# Patient Record
Sex: FEMALE | Race: WHITE
[De-identification: names, ages, dates, MRNs, and addresses within clinical notes are randomized per-mention and may not be internally consistent; named-entity substitution may affect disease eponyms.]

---

## 2017-03-20 ENCOUNTER — HOSPITAL ENCOUNTER (EMERGENCY)
Dept: HOSPITAL 87 - ER | Age: 62
LOS: 1 days | Discharge: HOME | End: 2017-03-21
Payer: MEDICAID

## 2017-03-20 VITALS — BODY MASS INDEX: 27.34 KG/M2 | WEIGHT: 154.32 LBS | HEIGHT: 63 IN

## 2017-03-20 VITALS — DIASTOLIC BLOOD PRESSURE: 69 MMHG | SYSTOLIC BLOOD PRESSURE: 107 MMHG

## 2017-03-20 DIAGNOSIS — E87.6: ICD-10-CM

## 2017-03-20 DIAGNOSIS — E83.51: ICD-10-CM

## 2017-03-20 DIAGNOSIS — Z90.49: ICD-10-CM

## 2017-03-20 DIAGNOSIS — E07.9: ICD-10-CM

## 2017-03-20 DIAGNOSIS — Z98.51: ICD-10-CM

## 2017-03-20 DIAGNOSIS — M19.90: ICD-10-CM

## 2017-03-20 DIAGNOSIS — Z88.5: ICD-10-CM

## 2017-03-20 DIAGNOSIS — N28.1: ICD-10-CM

## 2017-03-20 DIAGNOSIS — R94.8: ICD-10-CM

## 2017-03-20 DIAGNOSIS — Z91.013: ICD-10-CM

## 2017-03-20 DIAGNOSIS — R10.84: Primary | ICD-10-CM

## 2017-03-20 DIAGNOSIS — E78.5: ICD-10-CM

## 2017-03-20 DIAGNOSIS — R91.1: ICD-10-CM

## 2017-03-20 PROCEDURE — 83690 ASSAY OF LIPASE: CPT

## 2017-03-20 PROCEDURE — 80053 COMPREHEN METABOLIC PANEL: CPT

## 2017-03-20 PROCEDURE — 99285 EMERGENCY DEPT VISIT HI MDM: CPT

## 2017-03-20 PROCEDURE — 36415 COLL VENOUS BLD VENIPUNCTURE: CPT

## 2017-03-20 PROCEDURE — 84703 CHORIONIC GONADOTROPIN ASSAY: CPT

## 2017-03-20 PROCEDURE — 84702 CHORIONIC GONADOTROPIN TEST: CPT

## 2017-03-20 PROCEDURE — 85025 COMPLETE CBC W/AUTO DIFF WBC: CPT

## 2017-03-20 PROCEDURE — 96374 THER/PROPH/DIAG INJ IV PUSH: CPT

## 2017-03-20 PROCEDURE — 96361 HYDRATE IV INFUSION ADD-ON: CPT

## 2017-03-21 LAB
ALBUMIN SERPL BCP-MCNC: 3.2 G/DL (ref 3.4–5)
ALT SERPL W P-5'-P-CCNC: 39 IU/L (ref 13–61)
ANION GAP SERPL CALC-SCNC: 13 MMOL/L
BASOPHILS NFR BLD AUTO: 0.4 % (ref 0–2)
BUN SERPL-MCNC: 10 MG/DL (ref 7–21)
CALCIUM SERPL-MCNC: 8.1 MG/DL (ref 8.5–10.1)
CHLORIDE SERPL-SCNC: 110 MEQ/L (ref 98–107)
CO2 SERPL-SCNC: 21 MEQ/L (ref 21–32)
EOSINOPHIL NFR BLD AUTO: 1.5 % (ref 0–5)
ERYTHROCYTE [DISTWIDTH] IN BLOOD BY AUTOMATED COUNT: 13.7 % (ref 11.6–14.6)
GFR SERPLBLD BASED ON 1.73 SQ M-ARVRAT: > 60 ML/MIN (ref 60–?)
HCG SERPL QL: (no result)
HCT VFR BLD AUTO: 39.1 % (ref 36–48)
HEMOLYSIS INTERF INDEX SERPL-ACNC: 1 (ref 1–3)
HGB BLD-MCNC: 12.8 G/DL (ref 12–16)
ICTERIC INTERF INDEX SERPL-ACNC: 1 (ref 1–4)
LIPASE SERPL-CCNC: 129 IU/L (ref 73–393)
LIPEMIC INTERF INDEX SERPL-ACNC: 1 (ref 1–3)
LYMPHOCYTES NFR BLD AUTO: 26.5 % (ref 20–50)
MCH RBC QN AUTO: 28.9 PG (ref 28–32)
MCHC RBC AUTO-ENTMCNC: 32.8 G/DL (ref 31–37)
MCV RBC AUTO: 88.2 FL (ref 81–99)
MONOCYTES NFR BLD AUTO: 5.9 % (ref 2–8)
NEUTROPHILS NFR BLD AUTO: 65.7 % (ref 40–76)
PLATELET # BLD AUTO: 251 X1000/UL (ref 130–400)
PMV BLD AUTO: 7.6 FL (ref 7.4–10.4)
RBC # BLD AUTO: 4.44 MILL/UL (ref 4.2–5.4)
WBC # BLD: 8.6 X1000/UL (ref 4.5–11)

## 2018-02-05 ENCOUNTER — HOSPITAL ENCOUNTER (EMERGENCY)
Dept: HOSPITAL 87 - ER | Age: 63
Discharge: HOME | End: 2018-02-05
Payer: MEDICAID

## 2018-02-05 VITALS — BODY MASS INDEX: 25.78 KG/M2 | HEIGHT: 63 IN | WEIGHT: 145.51 LBS

## 2018-02-05 VITALS — DIASTOLIC BLOOD PRESSURE: 94 MMHG | SYSTOLIC BLOOD PRESSURE: 127 MMHG
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
## 2018-02-05 DIAGNOSIS — Z90.49: ICD-10-CM
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
## 2018-02-05 DIAGNOSIS — M19.90: ICD-10-CM

## 2018-02-05 DIAGNOSIS — Z98.51: ICD-10-CM
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
## 2018-02-05 DIAGNOSIS — Z91.013: ICD-10-CM

## 2018-02-05 DIAGNOSIS — Z88.5: ICD-10-CM

## 2018-02-05 DIAGNOSIS — S62.646A: Primary | ICD-10-CM

## 2018-02-05 PROCEDURE — 99284 EMERGENCY DEPT VISIT MOD MDM: CPT

## 2018-02-05 PROCEDURE — 73130 X-RAY EXAM OF HAND: CPT

## 2018-02-05 PROCEDURE — 29125 APPL SHORT ARM SPLINT STATIC: CPT

## 2019-02-26 ENCOUNTER — HOSPITAL ENCOUNTER (EMERGENCY)
Dept: HOSPITAL 87 - ER | Age: 64
Discharge: HOME | End: 2019-02-26
Payer: COMMERCIAL

## 2019-02-26 VITALS — SYSTOLIC BLOOD PRESSURE: 154 MMHG | DIASTOLIC BLOOD PRESSURE: 92 MMHG

## 2019-02-26 VITALS — HEIGHT: 64 IN | BODY MASS INDEX: 26.72 KG/M2 | WEIGHT: 156.53 LBS
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
## 2019-02-26 DIAGNOSIS — M19.90: ICD-10-CM

## 2019-02-26 DIAGNOSIS — Z98.890: ICD-10-CM

## 2019-02-26 DIAGNOSIS — Z90.49: ICD-10-CM

## 2019-02-26 DIAGNOSIS — Z88.6: ICD-10-CM

## 2019-02-26 DIAGNOSIS — Z98.51: ICD-10-CM
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
## 2019-02-26 DIAGNOSIS — S39.012A: Primary | ICD-10-CM

## 2019-02-26 DIAGNOSIS — Z91.013: ICD-10-CM

## 2019-02-26 LAB
APPEARANCE UR: CLEAR
COLOR UR: YELLOW
HGB UR QL STRIP: NEGATIVE
KETONES UR STRIP-MCNC: NEGATIVE MG/DL
LEUKOCYTE ESTERASE UR QL STRIP: NEGATIVE
NITRITE UR QL STRIP: NEGATIVE
PH UR STRIP: 6 [PH] (ref 4.5–8)
PROT UR QL STRIP: NEGATIVE
SP GR UR STRIP: 1 (ref 1–1.03)
UROBILINOGEN UR STRIP-MCNC: 0.2 E.U./DL (ref 0.2–1)

## 2019-02-26 PROCEDURE — 72100 X-RAY EXAM L-S SPINE 2/3 VWS: CPT

## 2019-02-26 PROCEDURE — 99284 EMERGENCY DEPT VISIT MOD MDM: CPT

## 2019-06-21 ENCOUNTER — HOSPITAL ENCOUNTER (OUTPATIENT)
Dept: HOSPITAL 10 - SDS | Age: 64
Discharge: HOME | End: 2019-06-21
Payer: COMMERCIAL

## 2019-06-21 ENCOUNTER — HOSPITAL ENCOUNTER (OUTPATIENT)
Dept: HOSPITAL 91 - SDS | Age: 64
Discharge: HOME | End: 2019-06-21
Payer: COMMERCIAL

## 2019-06-21 VITALS — RESPIRATION RATE: 26 BRPM | HEART RATE: 60 BPM | SYSTOLIC BLOOD PRESSURE: 101 MMHG | DIASTOLIC BLOOD PRESSURE: 66 MMHG

## 2019-06-21 VITALS — SYSTOLIC BLOOD PRESSURE: 107 MMHG | RESPIRATION RATE: 22 BRPM | DIASTOLIC BLOOD PRESSURE: 65 MMHG | HEART RATE: 56 BPM

## 2019-06-21 VITALS
HEIGHT: 64.5 IN | HEIGHT: 64.5 IN | WEIGHT: 151.24 LBS | WEIGHT: 151.24 LBS | BODY MASS INDEX: 25.51 KG/M2 | BODY MASS INDEX: 25.51 KG/M2

## 2019-06-21 VITALS — DIASTOLIC BLOOD PRESSURE: 69 MMHG | SYSTOLIC BLOOD PRESSURE: 110 MMHG | RESPIRATION RATE: 22 BRPM | HEART RATE: 61 BPM

## 2019-06-21 VITALS — HEART RATE: 63 BPM | DIASTOLIC BLOOD PRESSURE: 63 MMHG | SYSTOLIC BLOOD PRESSURE: 105 MMHG | RESPIRATION RATE: 16 BRPM

## 2019-06-21 VITALS — SYSTOLIC BLOOD PRESSURE: 111 MMHG | HEART RATE: 60 BPM | RESPIRATION RATE: 22 BRPM | DIASTOLIC BLOOD PRESSURE: 68 MMHG

## 2019-06-21 VITALS — RESPIRATION RATE: 16 BRPM | HEART RATE: 64 BPM | SYSTOLIC BLOOD PRESSURE: 138 MMHG | DIASTOLIC BLOOD PRESSURE: 87 MMHG

## 2019-06-21 VITALS — DIASTOLIC BLOOD PRESSURE: 66 MMHG | SYSTOLIC BLOOD PRESSURE: 113 MMHG | HEART RATE: 59 BPM | RESPIRATION RATE: 18 BRPM

## 2019-06-21 VITALS — SYSTOLIC BLOOD PRESSURE: 116 MMHG | HEART RATE: 62 BPM | RESPIRATION RATE: 23 BRPM | DIASTOLIC BLOOD PRESSURE: 72 MMHG

## 2019-06-21 VITALS — SYSTOLIC BLOOD PRESSURE: 113 MMHG | HEART RATE: 55 BPM | DIASTOLIC BLOOD PRESSURE: 66 MMHG | RESPIRATION RATE: 22 BRPM

## 2019-06-21 DIAGNOSIS — E78.5: ICD-10-CM

## 2019-06-21 DIAGNOSIS — I10: ICD-10-CM

## 2019-06-21 DIAGNOSIS — E03.9: ICD-10-CM

## 2019-06-21 DIAGNOSIS — H26.8: Primary | ICD-10-CM

## 2019-06-21 PROCEDURE — 71045 X-RAY EXAM CHEST 1 VIEW: CPT

## 2019-06-21 PROCEDURE — V2632 POST CHMBR INTRAOCULAR LENS: HCPCS

## 2019-06-21 PROCEDURE — 66984 XCAPSL CTRC RMVL W/O ECP: CPT

## 2019-06-21 RX ADMIN — TOBRAMYCIN AND DEXAMETHASONE 1 APPLIC: 3; 1 OINTMENT OPHTHALMIC at 12:44

## 2019-06-21 RX ADMIN — TETRACAINE HYDROCHLORIDE 1 DROP: 5 SOLUTION OPHTHALMIC at 12:44

## 2019-06-21 RX ADMIN — LIDOCAINE HYDROCHLORIDE 1 ML: 40 INJECTION, SOLUTION RETROBULBAR; TOPICAL at 12:44

## 2019-06-21 RX ADMIN — PHENYLEPHRINE HYDROCHLORIDE 1 DROP: 2.5 SOLUTION/ DROPS OPHTHALMIC at 09:57

## 2019-06-21 RX ADMIN — PYRIDOXINE HYDROCHLORIDE 1 MLS/HR: 100 INJECTION, SOLUTION INTRAMUSCULAR; INTRAVENOUS at 12:00

## 2019-06-21 RX ADMIN — DICLOFENAC SODIUM 1 DROP: 1 SOLUTION OPHTHALMIC at 09:56

## 2019-06-21 RX ADMIN — CYCLOPENTOLATE HYDROCHLORIDE 1 DROP: 10 SOLUTION/ DROPS OPHTHALMIC at 09:57

## 2019-06-21 RX ADMIN — CIPROFLOXACIN HYDROCHLORIDE 1 DROP: 3 SOLUTION/ DROPS OPHTHALMIC at 09:56

## 2019-06-21 RX ADMIN — Medication 1 EA: at 12:44

## 2019-06-21 RX ADMIN — OXYCODONE HYDROCHLORIDE AND ACETAMINOPHEN 1 TAB: 5; 325 TABLET ORAL at 13:09

## 2019-06-21 RX ADMIN — TETRACAINE HYDROCHLORIDE 1 DROP: 5 SOLUTION OPHTHALMIC at 09:56

## 2019-06-21 RX ADMIN — TROPICAMIDE 1 DROP: 10 SOLUTION/ DROPS OPHTHALMIC at 09:57

## 2019-06-21 RX ADMIN — ACETAMINOPHEN 1 MG: 325 TABLET, FILM COATED ORAL at 13:19

## 2019-06-21 NOTE — PAC
Date/Time of Note


Date/Time of Note


DATE: 6/21/19 


TIME: 12:46





Post-Anesthesia Notes


Post-Anesthesia Note


Last documented vital signs





Vital Signs


  Date      Temp  Pulse  Resp  B/P (MAP)   Pulse Ox  O2          O2 Flow    FiO2


Time                                                 Delivery    Rate


   6/21/19  97.2     64    16      138/87       100  Room Air


     09:41                          (104)





Activity:  WNL


Respiratory function:  WNL


Cardiovascular function:  WNL


Mental status:  Baseline


Pain reasonably controlled:  Yes


Hydration appropriate:  Yes


Nausea/Vomiting absent:  Yes











LEON ROSARIO MD               Jun 21, 2019 12:46

## 2019-06-21 NOTE — OPR
Date/Time of Note


Date/Time of Note


DATE: 6/21/19 


TIME: 12:51





Operative Report


Procedure Date:  Jun 21, 2019


Preoperative Diagnosis


Visually Significant Cataract Left Eye


Postoperative Diagnosis


Visually Significant Cataract Left Eye


Operation/Procedure Performed


Phacoemulsification with Intra-ocular Lens Implantation Left Eye


Surgeon


see signature line


Assistant


Ruddy


Anesthesia Type:  MAC


Anesthesiologist:  LEON ROSARIO MD


Tourniquet Time:  none


Estimated Blood Loss:  none


Transfusion


   none


Specimen


none


Grafts/Implants


IMPLANT: [employer] Acrysof IOL Model [id number] Power : 24.0 D


Complications


none


Pt Condition Post Procedure:  stable


Disposition:  PACU


Indications


INDICATIONS FOR SURGERY:


Patient has visually significant Cataract in the operative eye affecting the 


activities of daily living. this has affected patients ability to perform some 


of the daily tasks such as reading, watching Television, and in some cases 


driving. The patient has tried a change of glasses, which have failed to provide


the satisfactory improvement in vision.


Procedure Description


OPERATIVE REPORT:


The patient was identified and brought to the pre-op area. The operative eye was


identified and informed consent was obtained after the patient fully understood 


the risks, benefits and alternatives of the planned surgery.


Three sequential applications of Mydriacyl 1%, Phenylephrine 2.5%, Tetracaine 1%


and Ocuflox eyedrops were applied to the operative eye at 5 minute intervals. 


Patient was brought to the operating room and placed in a supine position. The 


monitoring equipment was attached and IV sedation was initiated by the 


Anesthesiologist.  The Operative eye was cleaned , prepared and draped in a 


sterile fashion. The eyelid speculum was placed and microscope was aligned for 


good visualization.


Side port incision of 1.1 mm was made at the limbus using a MVR blade and globe 


fixator. Preservative free Lidocaine 1% in BSS was injected to enhance the local


anesthesia. a dispersive Viscoelastic substance was injected in the anterior 


chamber. A clear corneal Limbal Temporal incision was performed using a 2.4 mm 


angled Keratome blade in a biplanar configuration and about 2.5 mm long. 


Anterior Capsulorrhexis was initiated with a Cystotome and completed with 


Utrata's scissors to achieve a 5 mm round, continuous and  curvilinear opening. 


Hydrodissection and hydrodelineation was performed to loosen up the nucleus and 


epinucleus by injecting BSS solution with a Hydrodissection canula. The nucleus 


was mobilized to rotate freely with a kuglin's hook.


The phaco handpiece of the [employer]ion phaco machine was brought in the 


field. The nucleus was disassembled by using a modified Stop and Chop technique.


Epinucleus was aspirated. Cortical material was aspirated with an I/A handpiece 


and angled soft tip aspirator.


A foldable Intraocular lens of the chosen model was inserted in the folded 


position into the capsular bag and was allowed to open fully. The lens was 


rotated to achieve a good central position in the bag. The viscoelastic 


substance was fully removed using an I/A system. 


The incisions were sealed by a hydrostatic closure by injecting BSS. The 


incision was tested for leakage and was found to be sealed.


The eyelid speculum was removed. Tobradex eye ointment and Ocuflox eyedrops were


instilled in the eye. Eye patch and eye shield were taped in place over closed 


eye.The patient tolerated the procedure very well without any complications.The 


patient was transferred to the recovery room in a stable condition for further 


monitoring. The patient was discharged to go home once the discharge criteria 


was met with written advice about post operative care and follow up appointment.











ROX LANGLEY MD               Jun 21, 2019 13:02

## 2019-06-21 NOTE — HPN
Date/Time of Note


Date/Time of Note


DATE: 6/21/19 


TIME: 11:55





Interval H&P Admission Note


Pt. seen H&P reviewed:  No system changes











ROX LANGLEY MD               Jun 21, 2019 11:56

## 2019-06-21 NOTE — PREAC
Date/Time of Note


Date/Time of Note


DATE: 6/21/19 


TIME: 11:47





Anesthesia Eval and Record


Evaluation


Time Pre-Procedure Interview


DATE: 6/21/19 


TIME: 11:47


Age


64


Sex


female


NPO:  8 hrs


Preoperative diagnosis


Cataract


Planned procedure


CE/IOL





Past Medical History


Past Medical History:  Includes


Cardio:  HTN, Dyslipidemia


Endo:  Hypothyroid


Musculoskeletal:  Osteoarthritis


GI:  Obesity





Surgery & Anesthesia Issues


No known issue





Meds


Anticoagulation:  No


Beta Blocker within 24 hr:  No


Reason Beta Blocker not given:  Pt. not on B-Blocker


Reported Medications


Celecoxib (Celecoxib) 100 Mg Capsule, 1 CAP ORAL BID


   6/21/19


Tramadol HCl (Tramadol HCl) 50 Mg Tablet, 25-50 MG ORAL TID PRN for PAIN LEVEL 


1-5


   6/21/19


Cholecalciferol* (Vitamin D3*) 1,000 Unit Tablet, 2000 UNIT PO DAILY, TAB


   6/21/19


Duloxetine Hcl* (Duloxetine Hcl*) 60 Mg Capsule.dr, 60 MG PO DAILY, #30 CAP


   6/21/19


Diclofenac Sodium* (Voltaren* Gel) 1% -100 Gm Gel, 4 GM TOP QID PRN for PAIN 


LEVEL 1-3, #1 TUB


   6/21/19


Atorvastatin Calcium (Atorvastatin Calcium) 20 Mg Tablet, 1 TAB ORAL DAILY


   6/21/19


Levothyroxine Sodium* (Levothyroxine Sodium*) 100 Mcg Tablet, 1 TAB ORAL QAM


   6/21/19


Hydrochlorothiazide* (Hydrochlorothiazide*) 25 Mg Tab, 1 TAB ORAL DAILY


   6/21/19


Discontinued Reported Medications


Diclofenac Epolamine (Flector) 30 Ea Adh..patch, 30 EA TP


   3/2/15


Calcium Carbonate (CALCIUM) 600 Mg Tablet, 600 MG PO


   3/2/15


Glucosamine Sulfate 2KCL (GLUCOSAMINE) 1,000 Mg Tablet, 1000 MG PO


   3/2/15


Fish Oil* (Fish Oil*) 1,000 Mg Cap, 1000 MG PO BID, CAP


   3/2/15


Levothyroxine Sodium* (Levothyroxine Sodium*) 25 Mcg Tablet, 25 MCG PO AC 


BREAKFAST, TAB


   3/2/15


Omeprazole* (Omeprazole*) 10 Mg Capsule.dr, 10 MG PO DAILY, CAP


   3/2/15





Current Medications


Tetracaine HCl (Tetracaine 0.5% Steri-Unit Sol) 1 drop Q5 MIN X3 OPER  Last 


administered on 6/21/19at 09:56; Admin Dose 1 DROP;  Start 6/21/19 at 08:00


Tropicamide (Mydriacyl 1%) 1 drop Q5 MIN X3 OPER  Last administered on 6/21/19at


09:57; Admin Dose 1 DROP;  Start 6/21/19 at 08:00


Phenylephrine HCl (Ak-Dilate 2.5%) 1 drop Q5 MIN X3 OPER  Last administered on 


6/21/19at 09:57; Admin Dose 1 DROP;  Start 6/21/19 at 08:00


Diclofenac Sodium (Voltaren 0.1%) 1 drop Q5 MIN X3 OPER  Last administered on 


6/21/19at 09:56; Admin Dose 1 DROP;  Start 6/21/19 at 08:00


Ciprofloxacin HCl (Ciloxan 0.3% Oph) 1 drop Q5 MIN X3 OPER  Last administered on


6/21/19at 09:56; Admin Dose 1 DROP;  Start 6/21/19 at 08:00


Gentamicin Sulfate (Gentamicin 0.3% Oph Drop) 1 drop Q5 MIN X3 OPER ;  Start 


6/21/19 at 08:00


Acetaminophen (Tylenol Tab) 650 mg Q6H  PRN PO PAIN;  Start 6/21/19 at 08:00


Cyclopentolate HCl (Ak-Pentolate 1% Oph) 1 drop Q5 MIN X3 OPER  Last 


administered on 6/21/19at 09:57; Admin Dose 1 DROP;  Start 6/21/19 at 06:00


Lactated Ringer's 1,000 ml @  20 mls/hr Q24H IV ;  Start 6/21/19 at 10:30


Meds reviewed:  Yes





Allergies


Coded Allergies:  


     shrimp (Verified  Allergy, Mild, 6/21/19)


     Morpholine Analogues (Verified  Allergy, Unknown, 6/21/19)


Allergies Reviewed:  Yes





Labs/Studies


Labs Reviewed:  Reviewed by anesthesiologist


Pregnancy test:  N/A





Pre-procedure Exam


Last vitals





Vital Signs


  Date      Temp  Pulse  Resp  B/P (MAP)   Pulse Ox  O2          O2 Flow    FiO2


Time                                                 Delivery    Rate


   6/21/19  97.2     64    16      138/87       100  Room Air


     09:41                          (104)





Airway:  Adequate mouth opening


Mallampati:  Mallampati II


Teeth:  Normal


Lung:  Normal


Heart:  Normal





ASA Physical Status


ASA physical status:  3


Emergency:  None





Planned Anesthetic


General/MAC:  MAC





Pre-operative Attestations


Prior to commencing anesthesia and surgery, the patient was re-evaluated, there 


was verification of:


*The patient's identity


*The results of appropriate recent lab work and preoperative vital signs


*The above evaluation not changing prior to induction


*Anesthetic plan, risk benefits, alternative and complications discussed with 


patient/family; questions answered; patient/family understands, accepts and 


wishes to proceed.











LEON ROSARIO MD               Jun 21, 2019 11:50

## 2020-04-24 ENCOUNTER — HOSPITAL ENCOUNTER (EMERGENCY)
Dept: HOSPITAL 54 - ER | Age: 65
Discharge: HOME | End: 2020-04-24
Payer: COMMERCIAL

## 2020-04-24 VITALS — HEIGHT: 64 IN | WEIGHT: 157 LBS | BODY MASS INDEX: 26.8 KG/M2

## 2020-04-24 VITALS — DIASTOLIC BLOOD PRESSURE: 85 MMHG | SYSTOLIC BLOOD PRESSURE: 124 MMHG

## 2020-04-24 DIAGNOSIS — I10: ICD-10-CM

## 2020-04-24 DIAGNOSIS — Z79.899: ICD-10-CM

## 2020-04-24 DIAGNOSIS — R10.32: Primary | ICD-10-CM

## 2020-04-24 DIAGNOSIS — Z88.6: ICD-10-CM

## 2020-04-24 LAB
ALBUMIN SERPL BCP-MCNC: 3.6 G/DL (ref 3.4–5)
ALP SERPL-CCNC: 96 U/L (ref 46–116)
ALT SERPL W P-5'-P-CCNC: 40 U/L (ref 12–78)
AST SERPL W P-5'-P-CCNC: 32 U/L (ref 15–37)
BASOPHILS # BLD AUTO: 0.1 /CMM (ref 0–0.2)
BASOPHILS NFR BLD AUTO: 1.1 % (ref 0–2)
BILIRUB DIRECT SERPL-MCNC: 0.1 MG/DL (ref 0–0.2)
BILIRUB SERPL-MCNC: 0.4 MG/DL (ref 0.2–1)
BUN SERPL-MCNC: 12 MG/DL (ref 7–18)
CALCIUM SERPL-MCNC: 9.1 MG/DL (ref 8.5–10.1)
CHLORIDE SERPL-SCNC: 104 MMOL/L (ref 98–107)
CO2 SERPL-SCNC: 26 MMOL/L (ref 21–32)
CREAT SERPL-MCNC: 0.9 MG/DL (ref 0.6–1.3)
EOSINOPHIL NFR BLD AUTO: 2.4 % (ref 0–6)
GLUCOSE SERPL-MCNC: 106 MG/DL (ref 74–106)
HCT VFR BLD AUTO: 41 % (ref 33–45)
HGB BLD-MCNC: 13.5 G/DL (ref 11.5–14.8)
LIPASE SERPL-CCNC: 226 U/L (ref 73–393)
LYMPHOCYTES NFR BLD AUTO: 2.7 /CMM (ref 0.8–4.8)
LYMPHOCYTES NFR BLD AUTO: 27.1 % (ref 20–44)
MCHC RBC AUTO-ENTMCNC: 33 G/DL (ref 31–36)
MCV RBC AUTO: 88 FL (ref 82–100)
MONOCYTES NFR BLD AUTO: 0.6 /CMM (ref 0.1–1.3)
MONOCYTES NFR BLD AUTO: 6 % (ref 2–12)
NEUTROPHILS # BLD AUTO: 6.4 /CMM (ref 1.8–8.9)
NEUTROPHILS NFR BLD AUTO: 63.4 % (ref 43–81)
PH UR STRIP: 7 [PH] (ref 5–8)
PLATELET # BLD AUTO: 284 /CMM (ref 150–450)
POTASSIUM SERPL-SCNC: 3.8 MMOL/L (ref 3.5–5.1)
PROT SERPL-MCNC: 8.1 G/DL (ref 6.4–8.2)
RBC # BLD AUTO: 4.65 MIL/UL (ref 4–5.2)
RBC #/AREA URNS HPF: (no result) /HPF (ref 0–2)
SODIUM SERPL-SCNC: 141 MMOL/L (ref 136–145)
UROBILINOGEN UR STRIP-MCNC: 0.2 EU/DL
WBC #/AREA URNS HPF: (no result) /HPF (ref 0–3)
WBC NRBC COR # BLD AUTO: 10.1 K/UL (ref 4.3–11)